# Patient Record
(demographics unavailable — no encounter records)

---

## 2025-01-10 NOTE — HEALTH RISK ASSESSMENT
[No] : No [No falls in past year] : Patient reported no falls in the past year [PHQ-2 Negative - No further assessment needed] : PHQ-2 Negative - No further assessment needed [Current] : Current

## 2025-01-10 NOTE — HISTORY OF PRESENT ILLNESS
[de-identified] : Anxiety controlled with lorazepam patient takes 2 mg in the morning and 1 mg in the evening.  She has decreased from 4 mg a day.  It remains effective.  Addiction and tolerance discussed.  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  Possible taper of naltrexone discussed  Ulcerative colitis patient has no cramps diarrhea or blood in stool has follow-up colonoscopy ordered later this year  Tobacco abuse need to quit discussed

## 2025-01-10 NOTE — PHYSICAL EXAM
[Normal] : normal rate, regular rhythm, normal S1 and S2 and no murmur heard [de-identified] : Erythematous scaly patch nape of neck and soft mobile Afua sized lipoma right low back

## 2025-04-16 NOTE — HISTORY OF PRESENT ILLNESS
[FreeTextEntry1] : Renewal of Ativan [de-identified] : Elevated dose of 2 mg in the morning and 1 mg in the evening has been controlling her situational anxiety.  Ulcerative colitis patient is overdue for colonoscopy she will obtain it  Now on estrogen and progesterone replacement with her GYN for hot flashes and brain fog.  She has mammogram and bone density ordered.  She vapes encouraged her to quit  Psoriasis well-controlled with betamethasone  Status post excision of lipoma  Complaining of brain fog patient often will forget what she is doing ( i.e. she goes to the basement forgets why she is there and then it comes to her )however she always manages to reorient herself this is going on for at least 2 years and is not progressive  Remains clean and sober

## 2025-04-16 NOTE — HEALTH RISK ASSESSMENT
[No] : No [No falls in past year] : Patient reported no falls in the past year [PHQ-2 Negative - No further assessment needed] : PHQ-2 Negative - No further assessment needed [Former] : Former